# Patient Record
(demographics unavailable — no encounter records)

---

## 2025-01-10 NOTE — HISTORY OF PRESENT ILLNESS
[FreeTextEntry1] : 72-year-old male presents for movement disorders evaluation. Referred by Dr Martin   Patient was diagnosed in his 30s with schizophrenia. He was initially treated with Haldol for many years. He and his wife state his symptoms have been well controlled for nearly 15 years. He is in contact with a therapist q1-2 week. He sees a psychiatrist q3 months but used to go 1 x per month before his gait worsened. He began to have tremors and gait imbalance 2 years ago. 3 months ago, he began to use a walker. He has FOG. 6 months ago he started Sinemet with Dr Darby. The dose was recently adjusted in April to 1.5 tabs TID. He States he did not have much of a difference with the increase in his medications. Denies LID.   7 months ago his psychiatrist started him on quetiapine because he felt his gait was worsened. This medication replaced another one he cannot recall the name of the drug.  Patient denies recent falls. Is unaware of LD kinetics. Is not currently doing PT or regular exercise regimen.    Psychiatrist Dr Santiago  [phone] [facility].   Interval visit  Patient states he is taking his Sinemet TID. Patient is reportedly walking well but still has some freezing. Does not note any pattern to when the freezing happens. Planning to restart PT soon. Patient has an aid 5 hours a day 2 days a week. Has an emergency button connected with help.     Non-motor symptoms:   -denies constipation  +urinary sxs, frequency due to BPH  -Sleeps well. Unsure of REM sleep behavior disorder symptoms -Denies visual and auditory hallucination +forgetfulness but denies confusion  -denies difficulty swallowing  Current medications Risperidone 1mg BID  Diphenhydramine 50mg  Benztropine 1mg Quetiapine Fumerate 100mg evening Carbidopa-levodopa 1.5 TID Rasagiline 1mg   Simvastatin  Tamsulosin 0.4mg Finasteride 5mg Levothyroxine 50 mcg

## 2025-01-10 NOTE — DISCUSSION/SUMMARY
[FreeTextEntry1] : 73-year-old male with history of schizophrenia on neuroleptics presents with parkinsonism x 2 years. iPD vs drug induced PD. Unable to get VERONICA scan because he would need to be off psych meds for 2 weeks which is not acceptable.    Motorically stable with current regimen   Patient was counseled on the following recommendations Continue LD regimen  Continue rasagiline restart PT referred for rock steady boxing   f/u 3 months

## 2025-01-10 NOTE — HISTORY OF PRESENT ILLNESS
[FreeTextEntry1] : 72-year-old male presents for movement disorders evaluation. Referred by Dr Martin   Patient was diagnosed in his 30s with schizophrenia. He was initially treated with Haldol for many years. He and his wife state his symptoms have been well controlled for nearly 15 years. He is in contact with a therapist q1-2 week. He sees a psychiatrist q3 months but used to go 1 x per month before his gait worsened. He began to have tremors and gait imbalance 2 years ago. 3 months ago, he began to use a walker. He has FOG. 6 months ago he started Sinemet with Dr Darby. The dose was recently adjusted in April to 1.5 tabs TID. He States he did not have much of a difference with the increase in his medications. Denies LID.   7 months ago his psychiatrist started him on quetiapine because he felt his gait was worsened. This medication replaced another one he cannot recall the name of the drug.  Patient denies recent falls. Is unaware of LD kinetics. Is not currently doing PT or regular exercise regimen.    Psychiatrist Dr Santiago  [phone] [facility].   Interval visit  Patient states he is taking his Sinemet TID. Patient is reportedly walking well but still has some freezing. Does not note any pattern to when the freezing happens. Planning to restart PT soon. Patient has an aid 5 hours a day 2 days a week. Has an emergency button connected with help.     Non-motor symptoms:   -denies constipation  +urinary sxs, frequency due to BPH  -Sleeps well. Unsure of REM sleep behavior disorder symptoms -Denies visual and auditory hallucination +forgetfulness but denies confusion  -denies difficulty swallowing  Current medications Risperidone 1mg BID  Diphenhydramine 50mg  Benztropine 1mg Quetiapine Fumerate 100mg evening Carbidopa-levodopa 1.5 TID Rasagiline 1mg   Simvastatin  Tamsulosin 0.4mg Finasteride 5mg Levothyroxine 50 mcg

## 2025-01-10 NOTE — HISTORY OF PRESENT ILLNESS
[FreeTextEntry1] : 72-year-old male presents for movement disorders evaluation. Referred by Dr Martin   Patient was diagnosed in his 30s with schizophrenia. He was initially treated with Haldol for many years. He and his wife state his symptoms have been well controlled for nearly 15 years. He is in contact with a therapist q1-2 week. He sees a psychiatrist q3 months but used to go 1 x per month before his gait worsened. He began to have tremors and gait imbalance 2 years ago. 3 months ago, he began to use a walker. He has FOG. 6 months ago he started Sinemet with Dr Darby. The dose was recently adjusted in April to 1.5 tabs TID. He States he did not have much of a difference with the increase in his medications. Denies LID.   7 months ago his psychiatrist started him on quetiapine because he felt his gait was worsened. This medication replaced another one he cannot recall the name of the drug.  Patient denies recent falls. Is unaware of LD kinetics. Is not currently doing PT or regular exercise regimen.    Psychiatrist Dr Santiago  [phone] [facility].   Interval visit  Patient states he is taking his Sinemet TID. Patient is reportedly walking well but still has some freezing. Does not note any pattern to when the freezing happens. Planning to restart PT soon. Patient has an aid 5 hours a day 2 days a week. Has an emergency button connected with help.     Non-motor symptoms:   -denies constipation  +urinary sxs, frequency due to BPH  -Sleeps well. Unsure of REM sleep behavior disorder symptoms -Denies visual and auditory hallucination +forgetfulness but denies confusion  -denies difficulty swallowing  Current medications Risperidone 1mg BID  Diphenhydramine 50mg  Benztropine 1mg Quetiapine Fumerate 100mg evening Carbidopa-levodopa 1.5 TID Rasagiline 1mg   Simvastatin  Tamsulosin 0.4mg Finasteride 5mg Levothyroxine 50 mcg

## 2025-01-10 NOTE — PHYSICAL EXAM
[General Appearance - Alert] : alert [Oriented To Time, Place, And Person] : oriented to person, place, and time [FreeTextEntry1] : +2 facial masking  Delayed upgaze +2 hypophonic speech +2 Rigidity of neck rotation +2 L>R Rigidity of limbs present with contralateral activation  +2 RUE Resting tremor 0 Action tremor 0 Postural tremor  +2 L>R Bradykinesia with finger tapping, hand supination/pronation, foot tapping, foot stomping. No dysmetria with finger to nose Gait: able to stand with hands crossed and without assistance +2 posture Normal gait initiation, normal stride length, absent bilateral arm swing, no FOG, requires multiple steps with turning. Able to walk on heels and toes negative pull test